# Patient Record
Sex: FEMALE | Race: WHITE | NOT HISPANIC OR LATINO | Employment: FULL TIME | URBAN - METROPOLITAN AREA
[De-identification: names, ages, dates, MRNs, and addresses within clinical notes are randomized per-mention and may not be internally consistent; named-entity substitution may affect disease eponyms.]

---

## 2020-01-09 ENCOUNTER — HISTORICAL (OUTPATIENT)
Dept: ADMINISTRATIVE | Facility: HOSPITAL | Age: 45
End: 2020-01-09

## 2021-05-05 ENCOUNTER — HISTORICAL (OUTPATIENT)
Dept: RADIOLOGY | Facility: HOSPITAL | Age: 46
End: 2021-05-05

## 2021-08-23 ENCOUNTER — HOSPITAL ENCOUNTER (OUTPATIENT)
Dept: MEDSURG UNIT | Facility: HOSPITAL | Age: 46
End: 2021-08-25
Attending: FAMILY MEDICINE | Admitting: FAMILY MEDICINE

## 2021-08-23 LAB
ABS NEUT (OLG): 9.1 X10(3)/MCL (ref 1.5–6.9)
ABS NEUT (OLG): 9.6 X10(3)/MCL (ref 1.5–6.9)
ALBUMIN SERPL-MCNC: 3.6 GM/DL (ref 3.5–5)
ALBUMIN SERPL-MCNC: 4 GM/DL (ref 3.5–5)
ALBUMIN/GLOB SERPL: 1 RATIO (ref 1.1–2)
ALBUMIN/GLOB SERPL: 1.2 RATIO (ref 1.1–2)
ALP SERPL-CCNC: 47 UNIT/L (ref 40–150)
ALP SERPL-CCNC: 55 UNIT/L (ref 40–150)
ALT SERPL-CCNC: 11 UNIT/L (ref 0–55)
ALT SERPL-CCNC: 16 UNIT/L (ref 0–55)
AMYLASE SERPL-CCNC: 41 UNIT/L (ref 25–125)
APPEARANCE, UA: ABNORMAL
AST SERPL-CCNC: 15 UNIT/L (ref 5–34)
AST SERPL-CCNC: 26 UNIT/L (ref 5–34)
B-HCG SERPL QL: NEGATIVE
BACTERIA SPEC CULT: ABNORMAL /HPF
BASOPHILS # BLD AUTO: 0 X10(3)/MCL (ref 0–0.1)
BASOPHILS # BLD AUTO: 0 X10(3)/MCL (ref 0–0.1)
BASOPHILS NFR BLD AUTO: 0 % (ref 0–1)
BASOPHILS NFR BLD AUTO: 0 % (ref 0–1)
BILIRUB SERPL-MCNC: 0.4 MG/DL
BILIRUB SERPL-MCNC: 0.4 MG/DL
BILIRUB UR QL STRIP: ABNORMAL
BILIRUBIN DIRECT+TOT PNL SERPL-MCNC: 0.1 MG/DL (ref 0–0.5)
BILIRUBIN DIRECT+TOT PNL SERPL-MCNC: 0.2 MG/DL (ref 0–0.5)
BILIRUBIN DIRECT+TOT PNL SERPL-MCNC: 0.2 MG/DL (ref 0–0.8)
BILIRUBIN DIRECT+TOT PNL SERPL-MCNC: 0.3 MG/DL (ref 0–0.8)
BUN SERPL-MCNC: 10 MG/DL (ref 7–18.7)
BUN SERPL-MCNC: 15 MG/DL (ref 7–18.7)
CALCIUM SERPL-MCNC: 8.8 MG/DL (ref 8.4–10.2)
CALCIUM SERPL-MCNC: 9.5 MG/DL (ref 8.4–10.2)
CHLORIDE SERPL-SCNC: 111 MMOL/L (ref 98–107)
CHLORIDE SERPL-SCNC: 111 MMOL/L (ref 98–107)
CO2 SERPL-SCNC: 16 MMOL/L (ref 22–29)
CO2 SERPL-SCNC: 22 MMOL/L (ref 22–29)
COLOR UR: ABNORMAL
CREAT SERPL-MCNC: 0.66 MG/DL (ref 0.55–1.02)
CREAT SERPL-MCNC: 0.72 MG/DL (ref 0.55–1.02)
EOSINOPHIL # BLD AUTO: 0.1 X10(3)/MCL (ref 0–0.6)
EOSINOPHIL # BLD AUTO: 0.2 X10(3)/MCL (ref 0–0.6)
EOSINOPHIL NFR BLD AUTO: 0 % (ref 0–5)
EOSINOPHIL NFR BLD AUTO: 1 % (ref 0–5)
ERYTHROCYTE [DISTWIDTH] IN BLOOD BY AUTOMATED COUNT: 12.8 % (ref 11.5–17)
ERYTHROCYTE [DISTWIDTH] IN BLOOD BY AUTOMATED COUNT: 12.8 % (ref 11.5–17)
GLOBULIN SER-MCNC: 3 GM/DL (ref 2.4–3.5)
GLOBULIN SER-MCNC: 3.9 GM/DL (ref 2.4–3.5)
GLUCOSE (UA): NEGATIVE MG/DL
GLUCOSE SERPL-MCNC: 105 MG/DL (ref 74–100)
GLUCOSE SERPL-MCNC: 125 MG/DL (ref 74–100)
HCT VFR BLD AUTO: 37.8 % (ref 36–48)
HCT VFR BLD AUTO: 41.7 % (ref 36–48)
HGB BLD-MCNC: 13 GM/DL (ref 12–16)
HGB BLD-MCNC: 14.6 GM/DL (ref 12–16)
HGB UR QL STRIP: ABNORMAL
IMM GRANULOCYTES # BLD AUTO: 0.04 10*3/UL (ref 0–0.02)
IMM GRANULOCYTES # BLD AUTO: 0.05 10*3/UL (ref 0–0.02)
IMM GRANULOCYTES NFR BLD AUTO: 0.3 % (ref 0–0.43)
IMM GRANULOCYTES NFR BLD AUTO: 0.4 % (ref 0–0.43)
KETONES UR QL STRIP: NEGATIVE MG/DL
LACTATE SERPL-SCNC: 1 MMOL/L (ref 0.5–2.2)
LEUKOCYTE ESTERASE UR QL STRIP: ABNORMAL
LIPASE SERPL-CCNC: 21 U/L
LYMPHOCYTES # BLD AUTO: 1.4 X10(3)/MCL (ref 0.5–4.1)
LYMPHOCYTES # BLD AUTO: 1.6 X10(3)/MCL (ref 0.5–4.1)
LYMPHOCYTES NFR BLD AUTO: 12 % (ref 15–40)
LYMPHOCYTES NFR BLD AUTO: 13 % (ref 15–40)
MAGNESIUM SERPL-MCNC: 1.5 MG/DL (ref 1.6–2.6)
MCH RBC QN AUTO: 32 PG (ref 27–34)
MCH RBC QN AUTO: 33 PG (ref 27–34)
MCHC RBC AUTO-ENTMCNC: 34 GM/DL (ref 31–36)
MCHC RBC AUTO-ENTMCNC: 35 GM/DL (ref 31–36)
MCV RBC AUTO: 93 FL (ref 80–99)
MCV RBC AUTO: 94 FL (ref 80–99)
MONOCYTES # BLD AUTO: 0.7 X10(3)/MCL (ref 0–1.1)
MONOCYTES # BLD AUTO: 0.7 X10(3)/MCL (ref 0–1.1)
MONOCYTES NFR BLD AUTO: 6 % (ref 4–12)
MONOCYTES NFR BLD AUTO: 6 % (ref 4–12)
NEUTROPHILS # BLD AUTO: 9.1 X10(3)/MCL (ref 1.5–6.9)
NEUTROPHILS # BLD AUTO: 9.6 X10(3)/MCL (ref 1.5–6.9)
NEUTROPHILS NFR BLD AUTO: 79 % (ref 43–75)
NEUTROPHILS NFR BLD AUTO: 81 % (ref 43–75)
NITRITE UR QL STRIP: POSITIVE
PH UR STRIP: 6 [PH] (ref 4.6–8)
PHOSPHATE SERPL-MCNC: 2.1 MG/DL (ref 2.3–4.7)
PLATELET # BLD AUTO: 225 X10(3)/MCL (ref 140–400)
PLATELET # BLD AUTO: 266 X10(3)/MCL (ref 140–400)
PMV BLD AUTO: 10 FL (ref 6.8–10)
PMV BLD AUTO: 9.9 FL (ref 6.8–10)
POTASSIUM SERPL-SCNC: 4 MMOL/L (ref 3.5–5.1)
POTASSIUM SERPL-SCNC: 4.4 MMOL/L (ref 3.5–5.1)
PROT SERPL-MCNC: 6.6 GM/DL (ref 6.4–8.3)
PROT SERPL-MCNC: 7.9 GM/DL (ref 6.4–8.3)
PROT UR QL STRIP: >=300 MG/DL
RBC # BLD AUTO: 4.02 X10(6)/MCL (ref 4.2–5.4)
RBC # BLD AUTO: 4.48 X10(6)/MCL (ref 4.2–5.4)
RBC #/AREA URNS HPF: ABNORMAL /HPF
SARS-COV-2 AG RESP QL IA.RAPID: NOT DETECTED
SODIUM SERPL-SCNC: 139 MMOL/L (ref 136–145)
SODIUM SERPL-SCNC: 140 MMOL/L (ref 136–145)
SP GR UR STRIP: 1.02 (ref 1–1.03)
SQUAMOUS EPITHELIAL, UA: ABNORMAL /LPF
UROBILINOGEN UR STRIP-ACNC: 0.2 EU/DL
WBC # SPEC AUTO: 11.6 X10(3)/MCL (ref 4.5–11.5)
WBC # SPEC AUTO: 11.9 X10(3)/MCL (ref 4.5–11.5)
WBC #/AREA URNS HPF: ABNORMAL /HPF

## 2021-08-24 LAB
ABS NEUT (OLG): 4.1 X10(3)/MCL (ref 1.5–6.9)
ALBUMIN SERPL-MCNC: 2.9 GM/DL (ref 3.5–5)
ALBUMIN/GLOB SERPL: 1.1 RATIO (ref 1.1–2)
ALP SERPL-CCNC: 37 UNIT/L (ref 40–150)
ALT SERPL-CCNC: 9 UNIT/L (ref 0–55)
AST SERPL-CCNC: 11 UNIT/L (ref 5–34)
BASOPHILS # BLD AUTO: 0 X10(3)/MCL (ref 0–0.1)
BASOPHILS NFR BLD AUTO: 0 % (ref 0–1)
BILIRUB SERPL-MCNC: 0.3 MG/DL
BILIRUBIN DIRECT+TOT PNL SERPL-MCNC: 0.1 MG/DL (ref 0–0.8)
BILIRUBIN DIRECT+TOT PNL SERPL-MCNC: 0.2 MG/DL (ref 0–0.5)
BUN SERPL-MCNC: 7 MG/DL (ref 7–18.7)
CALCIUM SERPL-MCNC: 8.5 MG/DL (ref 8.4–10.2)
CHLORIDE SERPL-SCNC: 114 MMOL/L (ref 98–107)
CO2 SERPL-SCNC: 23 MMOL/L (ref 22–29)
CREAT SERPL-MCNC: 0.6 MG/DL (ref 0.55–1.02)
EOSINOPHIL # BLD AUTO: 0.1 X10(3)/MCL (ref 0–0.6)
EOSINOPHIL NFR BLD AUTO: 2 % (ref 0–5)
ERYTHROCYTE [DISTWIDTH] IN BLOOD BY AUTOMATED COUNT: 13 % (ref 11.5–17)
GLOBULIN SER-MCNC: 2.6 GM/DL (ref 2.4–3.5)
GLUCOSE SERPL-MCNC: 92 MG/DL (ref 74–100)
GRAM STN SPEC: NORMAL
HAV IGM SERPL QL IA: NONREACTIVE
HBV CORE IGM SERPL QL IA: NONREACTIVE
HBV SURFACE AG SERPL QL IA: NONREACTIVE
HCT VFR BLD AUTO: 34.1 % (ref 36–48)
HCV AB SERPL QL IA: NONREACTIVE
HEPATITIS PANEL INTERP: NORMAL
HGB BLD-MCNC: 11.1 GM/DL (ref 12–16)
HIV 1+2 AB+HIV1 P24 AG SERPL QL IA: NONREACTIVE
IMM GRANULOCYTES # BLD AUTO: 0.01 10*3/UL (ref 0–0.02)
IMM GRANULOCYTES NFR BLD AUTO: 0.2 % (ref 0–0.43)
LYMPHOCYTES # BLD AUTO: 1.4 X10(3)/MCL (ref 0.5–4.1)
LYMPHOCYTES NFR BLD AUTO: 23 % (ref 15–40)
MAGNESIUM SERPL-MCNC: 1.6 MG/DL (ref 1.6–2.6)
MCH RBC QN AUTO: 32 PG (ref 27–34)
MCHC RBC AUTO-ENTMCNC: 33 GM/DL (ref 31–36)
MCV RBC AUTO: 97 FL (ref 80–99)
MONOCYTES # BLD AUTO: 0.5 X10(3)/MCL (ref 0–1.1)
MONOCYTES NFR BLD AUTO: 8 % (ref 4–12)
NEUTROPHILS # BLD AUTO: 4.1 X10(3)/MCL (ref 1.5–6.9)
NEUTROPHILS NFR BLD AUTO: 67 % (ref 43–75)
PHOSPHATE SERPL-MCNC: 2.3 MG/DL (ref 2.3–4.7)
PLATELET # BLD AUTO: 200 X10(3)/MCL (ref 140–400)
PMV BLD AUTO: 10.3 FL (ref 6.8–10)
POTASSIUM SERPL-SCNC: 3.6 MMOL/L (ref 3.5–5.1)
PROT SERPL-MCNC: 5.5 GM/DL (ref 6.4–8.3)
RBC # BLD AUTO: 3.5 X10(6)/MCL (ref 4.2–5.4)
SODIUM SERPL-SCNC: 143 MMOL/L (ref 136–145)
T PALLIDUM AB SER QL: NONREACTIVE
WBC # SPEC AUTO: 6.2 X10(3)/MCL (ref 4.5–11.5)

## 2021-08-29 LAB
FINAL CULTURE: NORMAL

## 2021-09-27 LAB — FINAL CULTURE: NORMAL

## 2022-04-30 NOTE — OP NOTE
DATE OF SURGERY:        SURGEON:  Jas Oliva MD  ASSISTANT:  Gilberto Salguero, Surgical Nurse    DIAGNOSIS:    1. 44415.  Development of flap, ulnar aspect of right ring finger.   2. 88382.  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   3. 41866.  Short-arm splint immobilization.    INDICATIONS:  This lady had an exquisitely tender tumor on the ulnar aspect of the distal portion of the right ring finger.  A tiny bump about the size of a BB could be felt, which was exquisitely tender.  This did not go under the nail or the nailbed. It had the consistency and the qualities of being a glomus tumor.  There was nothing to do feel, other than when I could appreciate her expression of pain.  I told her we would remove the tissue in this area of the finger all the way down to the bone and send it to the laboratory for histological section.  It just about had to be some type of neurologic or vascular tumor.  I have discussed the surgery with her in detail, including the risks and the possibility of complications.  She agreed with that thought process.    DESCRIPTION OF PROCEDURE:  The patient was prepped and draped in the usual manner after an axillary block had been administered.  A pneumatic tourniquet was inflated to 180 mmHg after the arm had been exsanguinated.  I then made a linear incision 3 mm from the lateral to the ulnar side of the nail at the distal end of the ring finger.  I carried the incision down, and I saw a vascular-appearing tumor which was approximately 2-3 mm in diameter.  It went down to the bone.  I undermined this and went all the way to the bone, and then removed the entirety of the tissue around it.  The area that looked like a tumor was distinctly different from the surrounding fat.  This was sent to the lab for histological frozen section to see if the pathology could identify what this was.       A tiny bit of Kenalog was instilled  in the wound and the wound closed with interrupted stitches, followed by a large compression dressing with a Gómez-Kai universal hand splint.  The patient tolerated the procedure well and went to the recovery room in good condition.      ______________________________  MD ERIC Higgins/SD  DD:  01/09/2020  Time:  09:53AM  DT:  01/09/2020  Time:  10:12AM  Job #:  [id number]

## 2022-04-30 NOTE — DISCHARGE SUMMARY
ADMISSION DIAGNOSES:  Include pyelonephritis, fever, elevated white count, __________ , hydroureteronephrosis, history of hypertension.    DISCHARGE DIAGNOSES:  Include:  1. Urinary tract infection secondary to Escherichia coli, pan sensitive.  2. Acute pyelonephritis with hydronephrosis and hydroureter, post cystoscopy. No stent placement.  3. History of hypertension.  4. History of hypertriglyceridemia.    HOSPITAL COURSE:  The patient is 46 years old.  She was admitted secondary to pyelonephritis with acute hydronephrosis and hydroureter.  She came in secondary to abdominal pain.  She was very weak.  She had a white count that was elevated.  Her CO2 was 16.  She was placed in-house on antibiotics.  She was placed on Rocephin.  She had a lot of abdominal pain and left back pain.  Her CT findings correlated this with the dilation of her renal pelvis on the left and her ureter on the left.  She also had some dilatation of the ureter on the right.  Due to the extensive nature of this infection, I did consult Dr. Ivy and he saw her __________ to make sure she did not have a stenosis or any findings in her bladder.  He found that she had cervical discharge and suspected PID.  He added Flagyl.  Due to this, I back ordered GC and chlamydia on her urine that was done upon admission.  Also added doxycycline and will follow the aerobic and anaerobic cultures that were taken from her discharge in the OR.  Her OB/GYN is Dr. Tilley.  I talked to Dr. Tilley because on her CT it was suspected that she may have gonadal vein thrombosis, the ultrasound did not confirm this.  Her pelvic exam was benign and with no pain.  In the OR, obviously she was anesthetized.  We will follow those cultures, follow the GC     and chlamydia.  She will go home on Cipro, which the urine is sensitive to; as well as   doxycycline for 10 days for both of those.  She is to follow up with me and Dr. Tilley and Dr. Ivy.      NBW/MODL   DD:  08/26/2021 0510   DT: 08/26/2021 0523  [facility] # [id number]

## 2022-04-30 NOTE — ED PROVIDER NOTES
Patient:   Sharon Malcolm             MRN: [id number]            FIN: [id number]               Age:   46 years     Sex:  Female     :  1975   Associated Diagnoses:   Acute pyelonephritis   Author:   Awa Montez      Basic Information   Time seen: Date & time 2021 05:45:00.   History source: Patient.   Arrival mode: Private vehicle, walking.   History limitation: None.   Additional information: Chief Complaint from Nursing Triage Note : Chief Complaint   2021 5:46 CDT       Chief Complaint           c/o LLQ abd pain starting saturday, states took an enema, had a BM but minimal relief,  .      History of Present Illness   The patient presents with abdominal pain.  The onset was 2  days ago.  The course/duration of symptoms is constant and worsening.  The character of symptoms is achy and crampy.  The degree at onset was minimal.  The Location of pain at onset was left, lower, abdominal and flank.  The degree at present is severe moderate .  The Location of pain at present is left, lower and abdominal.  Radiating pain: none. The exacerbating factor is none.  The relieving factor is none.  Therapy today: none.  Risk factors consist of none.  Associated symptoms: chills none .  Additional history: none.        Review of Systems   Constitutional symptoms:  Negative except as documented in HPI, no fever, no chills, no sweats, no weakness, no fatigue, no decreased activity.    Skin symptoms:  Negative except as documented in HPI, no jaundice, no rash, no pruritus, no abrasions, no breakdown, no burns, no dryness, no petechiae, no lesion.    Eye symptoms:  Negative except as documented in HPI, no recent vision problems, no pain, no discharge, no icterus, no diplopia, no blurred vision, no blindness.    ENMT symptoms:  Negative except as documented in HPI, no ear pain, no sore throat, no nasal congestion, no sinus pain.    Respiratory symptoms:  Negative except as documented in HPI, no shortness of  breath, no orthopnea, no cough, no hemoptysis, no sputum production, no stridor, no wheezing.    Cardiovascular symptoms:  Negative except as documented in HPI, no chest pain, no palpitations, no tachycardia, no syncope, no diaphoresis, no peripheral edema.    Gastrointestinal symptoms:  Abdominal pain, severe, left lower quadrant, periumbilical, suprapubic, nausea, no vomiting, no diarrhea, no constipation, no rectal bleeding, no rectal pain.    Genitourinary symptoms:  Dysuria Negative except as documented in HPI No hematuria,    Musculoskeletal symptoms:  Negative except as documented in HPI, no back pain, no Muscle pain, no Joint pain, no Claudication.    Neurologic symptoms:  Negative except as documented in HPI, no headache, no dizziness, no altered level of consciousness, no numbness, no tingling, no weakness.    Psychiatric symptoms:  Negative except as documented in HPI, no anxiety, no depression, no sleeping problems, no substance abuse.    Endocrine symptoms:  Negative except as documented in HPI, no polyuria, no polydipsia, no polyphagia, no hyperglycemia, no hypoglycemia.    Hematologic/Lymphatic symptoms:  Negative except as documented in HPI, bleeding tendency negative, bruising tendency negative, no petechiae, no gum bleeding, no swollen nodes.    Allergy/immunologic symptoms:  Negative except as documented in HPI, no seasonal allergies, no food allergies, no recurrent infections, no impaired immunity.              Additional review of systems information: All other systems reviewed and otherwise negative.      Health Status   Allergies:    Allergies (1) Active Reaction  No Known Medication Allergies None Documented  .   Medications:  (Selected)   Documented Medications  Documented  DULoxetine 60 mg oral delayed release capsule: 60 mg = 1 cap(s), Oral, Daily  enalapril 5 mg oral tablet: 5 mg = 1 tab(s), Oral, Daily  spironolactone 50 mg oral tablet: 50 mg = 1 tab(s), Oral, BID.   Immunizations:  Include Immunizations   Previous  No previous immunizations have been selected or recorded.   Future  No future immunizations have been selected or recorded. .      Past Medical/ Family/ Social History   Medical history:    No active or resolved past medical history items have been selected or recorded., Reviewed as documented in chart.   Surgical history:    36149 - Excision of neuroma; digital nerve, 1 or both, doc (Right, Hand) on 1/9/2020 at 44 Years.  Comments:  1/13/2020 15:43 CST - Angela Simon  auto-populated from documented surgical case  Meckel diverticulitis ([id number])., Reviewed as documented in chart.   Family history:    No family history items have been selected or recorded., Reviewed as documented in chart.   Social history: Reviewed as documented in chart.   Problem list:    Active Problems (2)  Anxiety   HTN - Hypertension   .      Physical Examination               Vital Signs   Vital Signs   8/23/2021 5:46 CDT       Temperature Oral          36.9 DegC                             Temperature Oral (calculated)             98.42 DegF                             Peripheral Pulse Rate     83 bpm                             Respiratory Rate          16 br/min                             SpO2                      96 %                             Oxygen Therapy            Room air                             Systolic Blood Pressure   149 mmHg  HI                             Diastolic Blood Pressure  87 mmHg  .   General:  Alert, no acute distress.    Skin:  Warm, intact, normal for ethnicity.    Head:  Normocephalic, atraumatic.    Neck:  Supple, trachea midline.    Eye:  Extraocular movements are intact, normal conjunctiva, vision grossly normal.    Ears, nose, mouth and throat:  Oral mucosa moist.   Cardiovascular:  Regular rate and rhythm, No edema.    Respiratory:  Lungs are clear to auscultation, respirations are non-labored, breath sounds are equal.    Chest wall:  No tenderness, No  deformity.    Musculoskeletal:  Normal ROM, no deformity.    Gastrointestinal:  Soft, Tenderness: Moderate, suprapubic, left flank, left lower quadrant, Guarding: Minimal, voluntary, Rebound: Negative, Bowel sounds: Quiet.     Gastrointestinal:  Soft, Nontender, Non distended, Normal bowel sounds.        Neurological:  Alert and oriented to person, place, time, and situation, No focal neurological deficit observed, normal speech observed.    Lymphatics:  No lymphadenopathy.   Psychiatric:  Cooperative.      Medical Decision Making   Differential Diagnosis:  Abdominal pain, renal stone, ureteral stone, pancreatitis, irritable bowel syndrome, urinary tract infection, pyelonephritis, gastroenteritis, diverticulitis, constipation.    Documents reviewed:  Emergency department nurses' notes, emergency department records, prior records.    Orders  Launch Order Profile (Selected)   Inpatient Orders  Ordered  Peripheral IV Insertion: 08/23/21 5:51:00 CDT  Ordered (Collected)  Amylase Level: STAT collect, 08/23/21 6:03:38 CDT, BLOOD, Collected, Stop date 08/23/21 6:03:00 CDT, Lab Collect  CBC w/ Auto Diff: STAT collect, 08/23/21 6:03:38 CDT, BLOOD, Collected, Stop date 08/23/21 6:03:00 CDT, Lab Collect  CMP: STAT collect, 08/23/21 6:03:38 CDT, BLOOD, Collected, Stop date 08/23/21 6:03:00 CDT, Lab Collect  Lipase Level: STAT collect, 08/23/21 6:03:38 CDT, BLOOD, Collected, Stop date 08/23/21 6:03:00 CDT, Lab Collect  Urine Culture [id number]: Stat collect, 08/23/21 5:49:00 CDT, Urine, Collected, Nurse collect, [id number], Stop date 08/23/21 5:49:00 CDT  Ordered (In-Lab)  UA Only Microscopic: Stat collect, Urine, Collected, 08/23/21 5:49:00 CDT, Stop date 08/23/21 5:49:00 CDT, Nurse collect  UPT - Urine Pregancy Test: Stat collect, Urine, 08/23/21 5:51:00 CDT, Stop date 08/23/21 5:51:00 CDT, Nurse collect, 08/23/21 5:51:00 CDT  Completed  IVF Normal Saline NS Infusion: 1,000 mL, 1,000 mL, IV, Once, 1000 mL/hr, start date  08/23/21 5:51:00 CDT, stop date 08/23/21 5:51:00 CDT, STAT, 1.66, m2  Urinalysis with Microscopic if Indicated: Stat collect, Urine, 08/23/21 5:51:00 CDT, Stop date 08/23/21 5:51:00 CDT, Nurse collect   Launch Order Profile (Selected) .   Results review:     No qualifying data available.      Reexamination/ Reevaluation   Time: 8/23/2021 08:36:00 .   Vital signs   results included from flowsheet : Vital Signs   8/23/2021 5:46 CDT       Temperature Oral          36.9 DegC                             Temperature Oral (calculated)             98.42 DegF                             Peripheral Pulse Rate     83 bpm                             Respiratory Rate          16 br/min                             SpO2                      96 %                             Oxygen Therapy            Room air                             Systolic Blood Pressure   149 mmHg  HI                             Diastolic Blood Pressure  87 mmHg     Notes: Is Dr. Andre Morfin adding an addendum I assumed care of this patient at 6 AM I agree with above assessment patient is alert and oriented ×4 regular rate and rhythm no murmurs rubs or gallops bilateral breath sounds are clear and equal in abdomen is mildly tender in the left flank left lower quadrant.  I will speak with Dr. Ghotra and we will admit the patient to hospital for pyelonephritis.      Impression and Plan   Diagnosis   Acute pyelonephritis ([id number] N10)      Calls-Consults   -  8/23/2021 09:05:00 , Brandin SAEED, Teri Zimmerman to admit.    Plan   Disposition: Admit time  8/23/2021 09:06:00, Place in Observation Unit, Patient care transitioned to: Time: 8/23/2021 06:04:00, Navjot SAEED, Andre BRITT    Counseled: Patient, Regarding diagnosis, Regarding diagnostic results, Regarding treatment plan, Patient indicated understanding of instructions.    Orders: Launch Orders   Pharmacy:  Zofran 2 mg/mL injectable solution (Order): 4 mg, form: Injection, IV Push, Once PRN for nausea, first  "dose 8/23/2021 9:10 CDT, STAT  morphine 2 mg/mL preservative-free injectable solution (Order): 4 mg, IV, Once, first dose 8/23/2021 9:08 CDT, stop date 8/23/2021 9:08 CDT, STAT  Rocephin (Order): 1 gm, Complicated UTI, IV, q12hr, first dose 8/23/2021 10:00 CDT  Admit/Transfer/Discharge:  Place in Outpatient Observation (Order): 8/23/2021 9:06 CDT, Medical Unit Brandin SAEED, Shabana Mcdaniel, No  .    Notes: "I, Awa Montez CMA, am scribing for and in the presence of Dr. Mcnamara."   8/23/2021 0545.       Addendum   I, ZAIRA Mcnamara MD, personally performed the services described in this documentation, as scribed in my presence, and it is both accurate and complete.   Awa Montez acted as scribe and she is a certified MA who was present during entire interaction with this patient.    "

## 2022-04-30 NOTE — CONSULTS
DATE OF CONSULTATION:  08/23/2021    CONSULTING PHYSICIAN:  Jean Ivy M.D.    HISTORY OF PRESENT ILLNESS:  This is a 46-year-old white female who had sudden onset of left flank pain on Saturday, 08/21, associated with nausea and dysuria, and some gross hematuria.  She took it as long as she could, and then she presented to the ER, and was admitted to the hospital with a diagnosis of pyelonephritis.  She had a CT scan with contrast that revealed some left hydronephrosis, some stones bilaterally, bilateral __________, bladder wall thickening, and inflammation.  She also had an area of concern with thrombosis and phlebitis of the left gonadal vein.  She had bilateral nonobstructing stones, but again the study was done with contrast and this is difficult to evaluate, and I was consulted for evaluation.  Her urine culture is currently pending.  She is currently on Rocephin, Toradol, morphine, ondansetron, duloxetine, and enalapril.  Her electrolytes are within normal limits.  Her creatinine was 0.66.  White blood cell count was 11.9, which was higher than on admission at 11.6.  She had 37.8 hematocrit went down from 41.7 with hydration.  She had 81 neutrophils.  She had no obvious bands.  Her urinalysis revealed positive nitrites, 25 to 50 white blood cells per high-power field, too numerous to count RBCs, and was grossly bloody.  She had a negative COVID-19.    PAST SURGICAL HISTORY:  Significant for a Meckel diverticulum excision approximately 8 years ago, and she has had several neuromas excised from her right hand.    MEDICATIONS:  At home consisted of duloxetine, enalapril, and spironolactone.    ALLERGIES:  SHE HAS NO KNOWN DRUG ALLERGIES.      SOCIAL HISTORY:  Negative for tobacco.  Social ethanol.  She denies any drug abuse.    REVIEW OF SYSTEMS:  The patient denies any recent weight loss.  She has had fever, and chills, and weakness.  She denies any headaches or diplopia.  No hearing problems.   No nosebleeds.  No difficulty with vision.  No dysphagia.  She denies any shortness of breath or cough.  No chest pain or palpitations.  She does have the abdominal pain, and nausea, and vomiting.  Abdominal pain is mainly in the left flank and left lower quadrant.  She denies any abnormal bleeding problems.  She denies any abnormal adenopathy.  She denies any excessive thirst or hair loss.  She denies any joint pain or swelling.  Denies any skin rashes.  Denies any neurological problems i.e. seizures or blackout spells.  Denies any psychiatric problems.    PHYSICAL EXAMINATION:  VITAL SIGNS:  Reveals her to have a temp of 36.8, blood pressure 99/61 was 149/87 on admission.  Pulse rate 78, O2 sats 96.     GENERAL:  She is alert and oriented x3.  She is cooperative.     HEENT:  She is normocephalic.  Eyes, ears, nose, and throat are grossly clear.     NECK:  Supple without adenopathy.  Trachea is midline.  Carotids full and equal bilaterally.   CHEST:  Symmetric.  It is clear to auscultation and percussion.   CARDIOVASCULAR:  Reveals a regular rate and rhythm without murmur or gallop.     ABDOMEN:  Soft.  She does have some mild left-sided tenderness.  No palpable masses.  No obvious hernias.   EXTREMITIES:  Demonstrate a full range of motion.  There is no dependent edema.     NEUROLOGIC:  Focally intact.  Motor and sensation are intact.  She appears to be well adjusted.   Skin:  Without lesions.    TEST DATA:  Laboratory and CT scan were as mentioned in the History of Present Illness.  CT scan was reviewed.    ASSESSMENT:  At this time, is acute pyelonephritis with renal calculus disease, left hydronephrosis, and __________, and bladder wall thickening with gross hematuria.    PLAN:  For cystoscopy bilateral retrogrades and possible stent.  Otherwise, we will continue her current medications and wait for her urine culture to return.        PA/TUSHAR   DD: 08/23/2021 1828   DT: 08/23/2021 1851  Job #  [id number]    cc: Shabana Ghotra M.D.

## 2022-04-30 NOTE — OP NOTE
PREOPERATIVE DIAGNOSES:  Left flank pain, hydronephrosis, pyelonephritis.    POSTOPERATIVE DIAGNOSES:  Left flank pain, hydronephrosis, pyelonephritis, and pelvic inflammatory disease.    ANESTHESIA:  IV sedation and local.    PROCEDURE:  Cystoscopy and bilateral retrograde pyelograms, and collection of cultures from the cervix.    DESCRIPTION OF PROCEDURE:  After operative consent, patient was brought to the operating room and placed on the table in supine position.  IV sedation induced.  Patient converted to dorsal lithotomy position.  Genital area prepped and draped in usual sterile fashion.  After adequate local anesthesia with 2% plain lidocaine jelly intraurethrally, the 22-Maltese cystoscope was advanced per urethra into the bladder.  Bladder demonstrated diffuse erythema and inflammatory looking condition.  Ureteral orifices were normal size, shape, and position with clear effluent.  There were no foreign bodies or tumors in the bladder.  At this time, left retrograde pyelogram was performed, which revealed basically normal appearing ureter.  No evidence of any hydronephrosis.  Catheter could easily be guided all the way up into the renal pelvis.  Right retrograde pyelogram was performed, which revealed likewise a normal-appearing ureter and renal pelvis without any hydronephrosis.  Again, the catheter could be guided all the way up into the renal pelvis without problems.  She had good prompt drainage upon decompression of the bladder.  At this time, the bladder was drained.  Cystoscope was removed.  Pelvic examination with grossly purulent drainage from the cervix.  This was sent for cultures for anaerobes and aerobes.  Otherwise, pelvic exam was unremarkable.  Rectal examination unremarkable.  Patient returned to her room in good condition.  She will be continued on her     routine medications.  I will also add some Flagyl 500 mg 1 p.o. t.i.d.  We will obtain a KUB of her abdomen in the a.m., and  she will be scheduled to follow up with me in 2 weeks.        CHB/MODL   DD: 08/23/2021 1948   DT: 08/23/2021 1959  Job # [id number]    cc: Shabana Ghotra M.D.

## 2022-04-30 NOTE — CONSULTS
Patient:   Sharon Malcolm             MRN: [id number]            FIN: [id number]               Age:   46 years     Sex:  Female     :  1975   Associated Diagnoses:   None   Author:   Jarek SAEED, Candelaria ALVARADO      Consultation Information   Consultation   Left lower quadrant pain      Basic Information   46year-old para 2 female who had her last menstrual period approximately 2 weeks ago who presented to the emergency room complaining of worsening left lower quadrant pain despite trying to take an enema.  Patient felt like this was a ovulation questionable ruptured ovarian cyst and was admitted for further evaluation.  Initial work-up evaluation looks as if patient had a acute pyelonephritis and was evaluated by the urourologist was a questionable diagnosis of pelvic inflammatory disease.  CT was consistent with acute pyelonephritis and ureteritis no evidence of any pelvic masses noted.  Pelvic sonogram revealed a normal appearing uterus normal-appearing adnexa no inflammatory changes were noted.       Chief Complaint   2021 5:46 CDT       c/o LLQ abd pain starting saturday, states took an enema, had a BM but minimal relief,        History of Present Illness   See basic information above      Review of Systems   See HPI      Health Status   Allergies:    Allergic Reactions (All)  No Known Medication Allergies   Current medications:  (Selected)   Inpatient Medications  Ordered  1/2 Normal Saline (0.45% NS) 1,000 mL: 1,000 mL, 1,000 mL, IV, 100 mL/hr, start date 21 7:56:00 CDT, 1.66, m2  DULoxetine 60 mg oral delayed release capsule: 60 mg, form: Cap-DR, Oral, Daily, first dose 21 9:11:00 CDT, STAT  Flagyl 250mg oral tablet: 500 mg, form: Tab Other - see special instructions, Oral, TID, first dose 21 22:00:00 CDT  NS 1,000 mL: 1,000 mL, 1,000 mL, IV, 50 mL/hr, start date 21 19:42:00 CDT, 1.66, m2  Rocephin: 1 gm, form: Injection Complicated UTI, IV, q12hr, Infuse over: 0.5 hr,  first dose 08/23/21 10:00:00 CDT  Toradol 30 mg for IV Push: 15 mg, form: Injection, IV Push, q6hr PRN for pain, severe, Order duration: 5 day(s), first dose 08/23/21 11:20:00 CDT, stop date 08/28/21 11:19:00 CDT  Zofran: 4 mg, form: Injection, IV Push, q4hr PRN for nausea, first dose 08/23/21 10:23:00 CDT, choose first if ordered with other treatments for nausea  acetaminophen: 1,000 mg, form: Tab, Oral, q6hr PRN for pain, first dose 08/23/21 10:23:00 CDT, mild/moderate  acetaminophen: 650 mg, form: Tab, Oral, q6hr PRN for fever, first dose 08/23/21 10:23:00 CDT, > 38.1 degrees Celsius  doxycycline: 100 mg, form: Injection Intra-abdominal infections, IV Piggyback, q12hr, Infuse over: 2 hr, first dose 08/24/21 9:00:00 CDT  enalapril 5 mg oral tablet: 5 mg, form: Tab, Oral, Daily, first dose 08/23/21 9:11:00 CDT, STAT  enoxaparin: 40 mg, form: Injection, Subcutaneous, Daily, first dose 08/23/21 22:00:00 CDT  morphine 2 mg/mL preservative-free intravenous solution: 2 mg, form: Injection, IV, q4hr PRN for pain, severe, first dose 08/23/21 11:20:00 CDT  Documented Medications  Documented  DULoxetine 60 mg oral delayed release capsule: 60 mg = 1 cap(s), Oral, Daily  enalapril 5 mg oral tablet: 5 mg = 1 tab(s), Oral, Daily  spironolactone 50 mg oral tablet: 50 mg = 1 tab(s), Oral, BID      Histories   Past Medical History:    No active or resolved past medical history items have been selected or recorded.   Family History:    No family history items have been selected or recorded.      Physical Examination   Vital Signs   8/24/2021 16:41 CDT      Temperature Oral          36.7 DegC                             Temperature Oral (calculated)             98.06 DegF                             Peripheral Pulse Rate     74 bpm                             SpO2                      99 %                             Systolic Blood Pressure   109 mmHg                             Diastolic Blood Pressure  72 mmHg                              Mean Arterial Pressure, Cuff              84 mmHg    8/24/2021 12:08 CDT      Temperature Oral          36.8 DegC                             Temperature Oral (calculated)             98.24 DegF                             Peripheral Pulse Rate     78 bpm                             SpO2                      99 %                             Systolic Blood Pressure   91 mmHg                             Diastolic Blood Pressure  64 mmHg                             Mean Arterial Pressure, Cuff              73 mmHg    8/24/2021 8:59 CDT       24 HR Intake Totals       0 mL                             24 HR Output Totals       0 mL                             24 HR I&O Balance         0 mL    8/24/2021 8:37 CDT       Temperature Oral          36.5 DegC                             Temperature Oral (calculated)             97.70 DegF                             Peripheral Pulse Rate     65 bpm                             SpO2                      98 %                             Systolic Blood Pressure   95 mmHg                             Diastolic Blood Pressure  60 mmHg                             Mean Arterial Pressure, Cuff              72 mmHg    8/24/2021 5:59 CDT       24 HR Intake Totals       1,123.17 mL                             24 HR Output Totals       0 mL    8/24/2021 5:00 CDT       Temperature Oral          36.8 DegC                             Peripheral Pulse Rate     73 bpm                             SpO2                      97 %                             Systolic Blood Pressure   116 mmHg                             Diastolic Blood Pressure  72 mmHg                             Mean Arterial Pressure, Cuff              87 mmHg    8/24/2021 4:59 CDT       24 HR Intake Totals       1,123.17 mL                             24 HR Output Totals       0 mL    8/24/2021 4:00 CDT       Temperature Oral          36.8 DegC                             Peripheral Pulse Rate     63 bpm                              SpO2                      95 %                             Systolic Blood Pressure   101 mmHg                             Diastolic Blood Pressure  67 mmHg                             Mean Arterial Pressure, Cuff              78 mmHg    8/24/2021 3:59 CDT       24 HR Intake Totals       1,123.17 mL                             24 HR Output Totals       0 mL    8/24/2021 3:00 CDT       Temperature Oral          36.4 DegC                             Peripheral Pulse Rate     66 bpm                             SpO2                      96 %                             Systolic Blood Pressure   104 mmHg                             Diastolic Blood Pressure  70 mmHg                             Mean Arterial Pressure, Cuff              81 mmHg    8/24/2021 2:59 CDT       24 HR Intake Totals       1,122.67 mL                             24 HR Output Totals       0 mL    8/24/2021 2:00 CDT       Temperature Oral          36.4 DegC                             Peripheral Pulse Rate     67 bpm                             SpO2                      95 %                             Systolic Blood Pressure   101 mmHg                             Diastolic Blood Pressure  64 mmHg                             Mean Arterial Pressure, Cuff              76 mmHg    8/24/2021 1:59 CDT       24 HR Intake Totals       1,122.67 mL                             24 HR Output Totals       0 mL    8/24/2021 1:00 CDT       Temperature Oral          36.6 DegC                             Peripheral Pulse Rate     64 bpm                             SpO2                      97 %                             Systolic Blood Pressure   106 mmHg                             Diastolic Blood Pressure  70 mmHg                             Mean Arterial Pressure, Cuff              82 mmHg    8/24/2021 0:59 CDT       24 HR Intake Totals       1,122.67 mL                             24 HR Output Totals       0 mL    8/24/2021 0:00 CDT       Temperature Oral           36.6 DegC                             Peripheral Pulse Rate     97 bpm                             SpO2                      97 %                             Systolic Blood Pressure   102 mmHg                             Diastolic Blood Pressure  65 mmHg                             Mean Arterial Pressure, Cuff              77 mmHg    8/23/2021 23:24 CDT      Temperature Oral          36.4 DegC                             Temperature Oral (calculated)             97.52 DegF                             Peripheral Pulse Rate     71 bpm                             Systolic Blood Pressure   97 mmHg                             Diastolic Blood Pressure  60 mmHg                             Mean Arterial Pressure, Cuff              72 mmHg    8/23/2021 23:00 CDT      SpO2                      96 %    8/23/2021 22:59 CDT      24 HR Intake Totals       1,122.67 mL                             24 HR Output Totals       0 mL    8/23/2021 22:00 CDT      Temperature Oral          36.4 DegC                             Peripheral Pulse Rate     60 bpm                             SpO2                      97 %                             Systolic Blood Pressure   93 mmHg                             Diastolic Blood Pressure  60 mmHg                             Mean Arterial Pressure, Cuff              71 mmHg    8/23/2021 21:59 CDT      24 HR Intake Totals       1,122.67 mL                             24 HR Output Totals       0 mL    8/23/2021 21:00 CDT      Temperature Oral          36.6 DegC                             Peripheral Pulse Rate     63 bpm                             SpO2                      97 %                             Systolic Blood Pressure   102 mmHg                             Diastolic Blood Pressure  68 mmHg                             Mean Arterial Pressure, Cuff              79 mmHg    8/23/2021 20:59 CDT      24 HR Intake Totals       1,122.17 mL                             24 HR Output Totals        0 mL    8/23/2021 20:00 CDT      Temperature Oral          36.6 DegC                             Peripheral Pulse Rate     61 bpm                             SpO2                      96 %                             Systolic Blood Pressure   105 mmHg                             Diastolic Blood Pressure  63 mmHg                             Mean Arterial Pressure, Cuff              77 mmHg    8/23/2021 16:00 CDT      Temperature Oral          36.8 DegC                             Temperature Oral (calculated)             98.24 DegF                             Peripheral Pulse Rate     78 bpm                             SpO2                      96 %                             Systolic Blood Pressure   99 mmHg                             Diastolic Blood Pressure  61 mmHg    8/23/2021 10:38 CDT      Temperature Oral          36.8 DegC                             Temperature Oral (calculated)             98.24 DegF                             Peripheral Pulse Rate     55 bpm  LOW                             Respiratory Rate          18 br/min                             SpO2                      99 %                             Systolic Blood Pressure   161 mmHg  HI                             Diastolic Blood Pressure  89 mmHg                             Mean Arterial Pressure, Cuff              113 mmHg    8/23/2021 10:35 CDT      Oxygen Therapy            Room air    8/23/2021 9:50 CDT       Peripheral Pulse Rate     79 bpm                             Respiratory Rate          16 br/min                             SpO2                      98 %                             Oxygen Therapy            Room air                             Systolic Blood Pressure   128 mmHg                             Diastolic Blood Pressure  78 mmHg    8/23/2021 7:30 CDT       Temperature Oral          36.6 DegC                             Temperature Oral (calculated)             97.88 DegF                             Peripheral Pulse  Rate     80 bpm                             Respiratory Rate          16 br/min                             SpO2                      98 %                             Oxygen Therapy            Room air                             Systolic Blood Pressure   132 mmHg                             Diastolic Blood Pressure  78 mmHg    8/23/2021 5:46 CDT       Temperature Oral          36.9 DegC                             Temperature Oral (calculated)             98.42 DegF                             Peripheral Pulse Rate     83 bpm                             Respiratory Rate          16 br/min                             SpO2                      96 %                             Oxygen Therapy            Room air                             Systolic Blood Pressure   149 mmHg  HI                             Diastolic Blood Pressure  87 mmHg        Impression and Plan   Diagnosis     Probable acute pyelonephritis and ureteritis no evidence of acute pelvic inflammatory disease at this point  Urine culture is positive for greater than 100,000 gram-negative rods same thing on Gram stain awaiting GC chlamydia PCR from urine.     Course:  Improving, Patient is improving states that she feels much better pelvic exam was performed that reveals a normal adnexal exam as well as no tenderness elicited with cervical motion or with uterine motion.    Orders     Await final results continue with current antibiotic regimen which will also cover for chlamydia patient clinically is much improved will discuss with primary care doctor Dr. Shabana Ghotra and will be on the sidelines for any further consultation if so indicated.     Course:  Improving.

## 2022-05-05 NOTE — HISTORICAL [facility]
This is a historical note converted from Sav. Formatting and pictures may have been removed.  Please reference Sav for original formatting and attached multimedia. Chief Complaint  c/o LLQ abd pain starting saturday, states took an enema, had a BM but minimal relief,  History of Present Illness  46 year old? wf? w a? hx of painful? periods? started having? abd pain on sat.? The pain became worse on? 8/22 pm? she? had?? taken an enema the night? before? this did not? help.?? She? said her urine was? brown.? she?? was? very? weak and? the pain in her abd? was? increasing and? she presented to? ER.? Her? CO2 was?16 her? wbc? count? was elevated. she? was started on rocephin . CT? showed? pyelo? and? she?? was? admitted.  I reviewed the? CT report? and it?? detailed hydroureteronephrosis on? left.? and fat stranding? and? pyelouretertiis? on the? right .? she is? admitted for? abx? and iv? fluids.  Review of Systems  Constitutional:?+fever, +fatigue, +weakness  Eye:?no vision loss, eye redness, drainage, or pain  ENMT:?no sore throat, ear pain, sinus pain/congestion, nasal congestion/drainage  Respiratory:?no cough, no wheezing, no shortness of breath  Cardiovascular:?no chest pain, no palpitations, no edema  Gastrointestinal:?_+ nausea, vomiting, or diarrhea.? left? abdominal pain  Genitourinary:?no dysuria, no urinary frequency or urgency, no hematuria  Hema/Lymph:?no abnormal bruising or bleeding  Endocrine:?no heat or cold intolerance, no excessive thirst or excessive urination  Musculoskeletal:?no muscle or joint pain, no joint swelling  Integumentary:?no skin rash or abnormal lesion  Neurologic: no headache, no dizziness, no weakness or numbness  ?  Physical Exam  Vitals & Measurements  T:?36.8? ?C (Oral)? TMIN:?36.6? ?C (Oral)? TMAX:?36.9? ?C (Oral)? HR:?55(Peripheral)? RR:?18? BP:?161/89? SpO2:?99%? WT:?63.5?kg? BMI:?24.61?  General:?well-developed well-nourished in no acute distress  Eye: JOSE GHOTRA,  clear conjunctiva, eyelids normal  HENT:?TMs/ear canals clear, oropharynx without erythema/exudate, oropharynx and nasal mucosal surfaces moist, no maxillary/frontal sinus tenderness to palpation  Neck: full range of motion, no thyromegaly or lymphadenopathy  Respiratory:?clear to auscultation bilaterally  Cardiovascular:?regular rate and rhythm without murmurs, gallops or rubs  Gastrointestinal:?soft, non-distended with normal bowel sounds, without masses to palpation, and tender? to LUQ, and?? mid abdomen? is? tender on left?;??  Genitourinary: no CVA tenderness to palpation  Musculoskeletal:?full range of motion of all extremities/spine without limitation or discomfort  Integumentary: no rashes or skin lesions present  Neurologic: cranial nerves intact, no signs of peripheral neurological deficit, motor/sensory function intact  ?  Assessment/Plan  1.?Acute pyelonephritis?N10  ?rocephin? 1? g?? bid? and? will? cont?? ivf  i have consulted dr craroll to eval pt and look at ct? to eval? that there? isnt any stenosis or? ureter or? stone that needs to be relieved  toradol? and morphine  2.?Accelerated hypertension with heart disease and without congestive heart failure?I11.9  ?cont? home meds  3.?Hypertriglyceridemia?E78.1  4.?Hydroureteronephrosis?N13.30  see #!  5.?UTI symptoms?R39.9  see #1  6.?Abdominal pain?[id number]  Orders:  ketorolac, 15 mg, form: Injection, IV Push, q6hr PRN for pain, severe, Order duration: 5 day(s), first dose 08/23/21 11:20:00 CDT, stop date 08/28/21 11:19:00 CDT  morphine, 2 mg, form: Injection, IV, q4hr PRN for pain, severe, first dose 08/23/21 11:20:00 CDT  CBC w/ Auto Diff, Routine collect, 08/24/21 5:00:00 CDT, Blood, Stop date 08/24/21 5:00:00 CDT, Lab Collect, 08/24/21 5:00:00 CDT  Comprehensive Metabolic Panel, ROUTINE collect, 08/23/21 12:37:39 CDT, BLOOD, Collected, Stop date 08/23/21 12:37:00 CDT, Lab Collect  Consult to Physician, 08/23/21 12:36:00  CDT, Cata SAEED, Jean BOSS, krystle ??? stone, No  Magnesium Level, NOW collect, 08/23/21 12:37:39 CDT, BLOOD, Collected, Stop date 08/23/21 12:37:00 CDT, Lab Collect  Magnesium Level, Routine collect, 08/24/21 5:00:00 CDT, Blood, Stop date 08/24/21 5:00:00 CDT, Lab Collect, 08/24/21 5:00:00 CDT  NPO, 08/23/21 12:36:00 CDT, CM NPO  Phosphorus Level, Routine collect, 08/24/21 5:00:00 CDT, Blood, Stop date 08/24/21 5:00:00 CDT, Lab Collect, 08/24/21 5:00:00 CDT  Phosphorus Level, ROUTINE collect, 08/23/21 12:37:39 CDT, BLOOD, Collected, Stop date 08/23/21 12:37:00 CDT, Lab Collect   Problem List/Past Medical History  Ongoing  Anxiety  HTN - Hypertension  Historical  No qualifying data  Procedure/Surgical History  77624 - Excision of neuroma; digital nerve, 1 or both, doc (Right, Hand) (01/09/2020)  Excision of neuroma; digital nerve, 1 or both, same digit (01/09/2020)  Excision of Right Hand Subcutaneous Tissue and Fascia, Open Approach (01/09/2020)  Meckel diverticulitis   Medications  Inpatient  acetaminophen, 650 mg= 2 tab(s), Oral, q6hr, PRN  acetaminophen, 1000 mg= 2 tab(s), Oral, q6hr, PRN  DULoxetine 60 mg oral delayed release capsule, 60 mg= 1 cap(s), Oral, Daily  enalapril 5 mg oral tablet, 5 mg= 1 tab(s), Oral, Daily  morphine 2 mg/mL preservative-free intravenous solution, 2 mg= 0.5 mL, IV, q4hr, PRN  Rocephin  Sodium Chloride 0.9% intravenous solution 1,000 mL, 1000 mL, IV  Toradol 30 mg for IV Push, 15 mg= 0.5 mL, IV Push, q6hr, PRN  Zofran, 4 mg= 2 mL, IV Push, q4hr, PRN  Home  DULoxetine 60 mg oral delayed release capsule, 60 mg= 1 cap(s), Oral, Daily  enalapril 5 mg oral tablet, 5 mg= 1 tab(s), Oral, Daily  spironolactone 50 mg oral tablet, 50 mg= 1 tab(s), Oral, BID  Allergies  No Known Medication Allergies  Social History  Abuse/Neglect  No, 08/23/2021  No, 01/09/2020  Alcohol  Current, 01/09/2020  Tobacco  Never (less than 100 in lifetime), No, 08/23/2021  Never (less than 100 in lifetime), No,  01/09/2020

## 2023-05-05 DIAGNOSIS — Z12.31 ENCOUNTER FOR SCREENING MAMMOGRAM FOR BREAST CANCER: Primary | ICD-10-CM

## 2023-06-14 ENCOUNTER — HOSPITAL ENCOUNTER (OUTPATIENT)
Dept: RADIOLOGY | Facility: HOSPITAL | Age: 48
Discharge: HOME | End: 2023-06-14
Attending: FAMILY MEDICINE
Payer: COMMERCIAL

## 2023-06-14 DIAGNOSIS — Z12.31 ENCOUNTER FOR SCREENING MAMMOGRAM FOR BREAST CANCER: ICD-10-CM

## 2023-06-14 PROCEDURE — 77067 SCR MAMMO BI INCL CAD: CPT | Mod: 26,,, | Performed by: STUDENT IN AN ORGANIZED HEALTH CARE EDUCATION/TRAINING PROGRAM

## 2023-06-14 PROCEDURE — 77067 MAMMO DIGITAL SCREENING BILAT WITH TOMO: ICD-10-PCS | Mod: 26,,, | Performed by: STUDENT IN AN ORGANIZED HEALTH CARE EDUCATION/TRAINING PROGRAM

## 2023-06-14 PROCEDURE — 77063 MAMMO DIGITAL SCREENING BILAT WITH TOMO: ICD-10-PCS | Mod: 26,,, | Performed by: STUDENT IN AN ORGANIZED HEALTH CARE EDUCATION/TRAINING PROGRAM

## 2023-06-14 PROCEDURE — 77067 SCR MAMMO BI INCL CAD: CPT | Mod: TC

## 2023-06-14 PROCEDURE — 77063 BREAST TOMOSYNTHESIS BI: CPT | Mod: 26,,, | Performed by: STUDENT IN AN ORGANIZED HEALTH CARE EDUCATION/TRAINING PROGRAM

## 2023-10-09 ENCOUNTER — LAB REQUISITION (OUTPATIENT)
Dept: LAB | Facility: HOSPITAL | Age: 48
End: 2023-10-09
Payer: COMMERCIAL

## 2023-10-09 DIAGNOSIS — L70.0 ACNE VULGARIS: ICD-10-CM

## 2023-10-09 LAB
ALBUMIN SERPL-MCNC: 4.1 G/DL (ref 3.5–5)
ALBUMIN/GLOB SERPL: 1.6 RATIO (ref 1.1–2)
ALP SERPL-CCNC: 48 UNIT/L (ref 40–150)
ALT SERPL-CCNC: 28 UNIT/L (ref 0–55)
AST SERPL-CCNC: 22 UNIT/L (ref 5–34)
BASOPHILS # BLD AUTO: 0.05 X10(3)/MCL
BASOPHILS NFR BLD AUTO: 0.9 %
BILIRUB SERPL-MCNC: 0.4 MG/DL
BUN SERPL-MCNC: 10.7 MG/DL (ref 9.8–20.1)
CALCIUM SERPL-MCNC: 9.4 MG/DL (ref 8.4–10.2)
CHLORIDE SERPL-SCNC: 105 MMOL/L (ref 98–107)
CHOLEST SERPL-MCNC: 194 MG/DL
CHOLEST/HDLC SERPL: 3 {RATIO} (ref 0–5)
CO2 SERPL-SCNC: 25 MMOL/L (ref 22–29)
CREAT SERPL-MCNC: 0.72 MG/DL (ref 0.55–1.02)
EOSINOPHIL # BLD AUTO: 0.14 X10(3)/MCL (ref 0–0.9)
EOSINOPHIL NFR BLD AUTO: 2.4 %
ERYTHROCYTE [DISTWIDTH] IN BLOOD BY AUTOMATED COUNT: 12.6 % (ref 11.5–17)
GFR SERPLBLD CREATININE-BSD FMLA CKD-EPI: >60 MLS/MIN/1.73/M2
GLOBULIN SER-MCNC: 2.6 GM/DL (ref 2.4–3.5)
GLUCOSE SERPL-MCNC: 87 MG/DL (ref 74–100)
HCT VFR BLD AUTO: 40.7 % (ref 37–47)
HDLC SERPL-MCNC: 66 MG/DL (ref 35–60)
HGB BLD-MCNC: 13.5 G/DL (ref 12–16)
IMM GRANULOCYTES # BLD AUTO: 0.01 X10(3)/MCL (ref 0–0.04)
IMM GRANULOCYTES NFR BLD AUTO: 0.2 %
IRON SATN MFR SERPL: 39 % (ref 20–50)
IRON SERPL-MCNC: 93 UG/DL (ref 50–170)
LDLC SERPL CALC-MCNC: 103 MG/DL (ref 50–140)
LYMPHOCYTES # BLD AUTO: 1.73 X10(3)/MCL (ref 0.6–4.6)
LYMPHOCYTES NFR BLD AUTO: 29.5 %
MCH RBC QN AUTO: 31.3 PG (ref 27–31)
MCHC RBC AUTO-ENTMCNC: 33.2 G/DL (ref 33–36)
MCV RBC AUTO: 94.2 FL (ref 80–94)
MONOCYTES # BLD AUTO: 0.29 X10(3)/MCL (ref 0.1–1.3)
MONOCYTES NFR BLD AUTO: 4.9 %
NEUTROPHILS # BLD AUTO: 3.65 X10(3)/MCL (ref 2.1–9.2)
NEUTROPHILS NFR BLD AUTO: 62.1 %
NRBC BLD AUTO-RTO: 0 %
PLATELET # BLD AUTO: 242 X10(3)/MCL (ref 130–400)
PMV BLD AUTO: 10.7 FL (ref 7.4–10.4)
POTASSIUM SERPL-SCNC: 4.2 MMOL/L (ref 3.5–5.1)
PROT SERPL-MCNC: 6.7 GM/DL (ref 6.4–8.3)
RBC # BLD AUTO: 4.32 X10(6)/MCL (ref 4.2–5.4)
SODIUM SERPL-SCNC: 139 MMOL/L (ref 136–145)
TIBC SERPL-MCNC: 145 UG/DL (ref 70–310)
TIBC SERPL-MCNC: 238 UG/DL (ref 250–450)
TRANSFERRIN SERPL-MCNC: 208 MG/DL (ref 180–382)
TRIGL SERPL-MCNC: 125 MG/DL (ref 37–140)
VLDLC SERPL CALC-MCNC: 25 MG/DL
WBC # SPEC AUTO: 5.87 X10(3)/MCL (ref 4.5–11.5)

## 2023-10-09 PROCEDURE — 80053 COMPREHEN METABOLIC PANEL: CPT | Performed by: DERMATOLOGY

## 2023-10-09 PROCEDURE — 85025 COMPLETE CBC W/AUTO DIFF WBC: CPT | Performed by: DERMATOLOGY

## 2023-10-09 PROCEDURE — 80061 LIPID PANEL: CPT | Performed by: DERMATOLOGY

## 2023-10-09 PROCEDURE — 83540 ASSAY OF IRON: CPT | Performed by: DERMATOLOGY

## 2023-11-09 ENCOUNTER — LAB REQUISITION (OUTPATIENT)
Dept: LAB | Facility: HOSPITAL | Age: 48
End: 2023-11-09
Payer: COMMERCIAL

## 2023-11-09 DIAGNOSIS — L70.0 ACNE VULGARIS: ICD-10-CM

## 2023-11-09 LAB
ALBUMIN SERPL-MCNC: 3.9 G/DL (ref 3.5–5)
ALBUMIN/GLOB SERPL: 1.5 RATIO (ref 1.1–2)
ALP SERPL-CCNC: 53 UNIT/L (ref 40–150)
ALT SERPL-CCNC: 23 UNIT/L (ref 0–55)
AST SERPL-CCNC: 24 UNIT/L (ref 5–34)
BASOPHILS # BLD AUTO: 0.07 X10(3)/MCL
BASOPHILS NFR BLD AUTO: 1.6 %
BILIRUB SERPL-MCNC: 0.3 MG/DL
BUN SERPL-MCNC: 12 MG/DL (ref 9.8–20.1)
CALCIUM SERPL-MCNC: 9.2 MG/DL (ref 8.4–10.2)
CHLORIDE SERPL-SCNC: 105 MMOL/L (ref 98–107)
CHOLEST SERPL-MCNC: 211 MG/DL
CHOLEST/HDLC SERPL: 4 {RATIO} (ref 0–5)
CO2 SERPL-SCNC: 30 MMOL/L (ref 22–29)
CREAT SERPL-MCNC: 0.75 MG/DL (ref 0.55–1.02)
EOSINOPHIL # BLD AUTO: 0.14 X10(3)/MCL (ref 0–0.9)
EOSINOPHIL NFR BLD AUTO: 3.3 %
ERYTHROCYTE [DISTWIDTH] IN BLOOD BY AUTOMATED COUNT: 12.9 % (ref 11.5–17)
GFR SERPLBLD CREATININE-BSD FMLA CKD-EPI: >60 MLS/MIN/1.73/M2
GLOBULIN SER-MCNC: 2.6 GM/DL (ref 2.4–3.5)
GLUCOSE SERPL-MCNC: 60 MG/DL (ref 74–100)
HCT VFR BLD AUTO: 42.2 % (ref 37–47)
HDLC SERPL-MCNC: 59 MG/DL (ref 35–60)
HGB BLD-MCNC: 14 G/DL (ref 12–16)
IMM GRANULOCYTES # BLD AUTO: 0.01 X10(3)/MCL (ref 0–0.04)
IMM GRANULOCYTES NFR BLD AUTO: 0.2 %
IRON SATN MFR SERPL: 40 % (ref 20–50)
IRON SERPL-MCNC: 107 UG/DL (ref 50–170)
LDLC SERPL CALC-MCNC: 133 MG/DL (ref 50–140)
LYMPHOCYTES # BLD AUTO: 1.22 X10(3)/MCL (ref 0.6–4.6)
LYMPHOCYTES NFR BLD AUTO: 28.4 %
MCH RBC QN AUTO: 31.7 PG (ref 27–31)
MCHC RBC AUTO-ENTMCNC: 33.2 G/DL (ref 33–36)
MCV RBC AUTO: 95.7 FL (ref 80–94)
MONOCYTES # BLD AUTO: 0.28 X10(3)/MCL (ref 0.1–1.3)
MONOCYTES NFR BLD AUTO: 6.5 %
NEUTROPHILS # BLD AUTO: 2.58 X10(3)/MCL (ref 2.1–9.2)
NEUTROPHILS NFR BLD AUTO: 60 %
NRBC BLD AUTO-RTO: 0 %
PLATELET # BLD AUTO: 218 X10(3)/MCL (ref 130–400)
PMV BLD AUTO: 10.2 FL (ref 7.4–10.4)
POTASSIUM SERPL-SCNC: 4.6 MMOL/L (ref 3.5–5.1)
PROT SERPL-MCNC: 6.5 GM/DL (ref 6.4–8.3)
RBC # BLD AUTO: 4.41 X10(6)/MCL (ref 4.2–5.4)
SODIUM SERPL-SCNC: 140 MMOL/L (ref 136–145)
TIBC SERPL-MCNC: 161 UG/DL (ref 70–310)
TIBC SERPL-MCNC: 268 UG/DL (ref 250–450)
TRANSFERRIN SERPL-MCNC: 240 MG/DL (ref 180–382)
TRIGL SERPL-MCNC: 97 MG/DL (ref 37–140)
VLDLC SERPL CALC-MCNC: 19 MG/DL
WBC # SPEC AUTO: 4.3 X10(3)/MCL (ref 4.5–11.5)

## 2023-11-09 PROCEDURE — 83540 ASSAY OF IRON: CPT | Performed by: DERMATOLOGY

## 2023-11-09 PROCEDURE — 85025 COMPLETE CBC W/AUTO DIFF WBC: CPT | Performed by: DERMATOLOGY

## 2023-11-09 PROCEDURE — 80061 LIPID PANEL: CPT | Performed by: DERMATOLOGY

## 2023-11-09 PROCEDURE — 80053 COMPREHEN METABOLIC PANEL: CPT | Performed by: DERMATOLOGY

## 2025-04-14 DIAGNOSIS — Z12.31 ENCOUNTER FOR SCREENING MAMMOGRAM FOR MALIGNANT NEOPLASM OF BREAST: Primary | ICD-10-CM

## 2025-05-01 ENCOUNTER — HOSPITAL ENCOUNTER (OUTPATIENT)
Dept: RADIOLOGY | Facility: HOSPITAL | Age: 50
Discharge: HOME | End: 2025-05-01
Attending: FAMILY MEDICINE
Payer: COMMERCIAL

## 2025-05-01 DIAGNOSIS — Z12.31 ENCOUNTER FOR SCREENING MAMMOGRAM FOR MALIGNANT NEOPLASM OF BREAST: ICD-10-CM

## 2025-05-01 PROCEDURE — 77063 BREAST TOMOSYNTHESIS BI: CPT | Mod: TC
